# Patient Record
Sex: FEMALE | Race: WHITE
[De-identification: names, ages, dates, MRNs, and addresses within clinical notes are randomized per-mention and may not be internally consistent; named-entity substitution may affect disease eponyms.]

---

## 2021-06-22 ENCOUNTER — HOSPITAL ENCOUNTER (EMERGENCY)
Dept: HOSPITAL 19 - COL.ER | Age: 36
Discharge: HOME | End: 2021-06-22
Attending: EMERGENCY MEDICINE
Payer: COMMERCIAL

## 2021-06-22 VITALS — HEART RATE: 105 BPM

## 2021-06-22 VITALS — SYSTOLIC BLOOD PRESSURE: 115 MMHG | DIASTOLIC BLOOD PRESSURE: 68 MMHG

## 2021-06-22 VITALS — WEIGHT: 215.39 LBS | BODY MASS INDEX: 35.89 KG/M2 | HEIGHT: 65 IN

## 2021-06-22 DIAGNOSIS — M23.91: ICD-10-CM

## 2021-06-22 DIAGNOSIS — S60.212A: ICD-10-CM

## 2021-06-22 DIAGNOSIS — S80.02XA: Primary | ICD-10-CM
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
## 2021-06-22 LAB
PH UR STRIP.AUTO: 5 [PH] (ref 5–8)
RBC # UR: (no result) /HPF
SP GR UR STRIP.AUTO: 1 (ref 1–1.03)
SQUAMOUS # URNS: (no result) /HPF
URN COLLECT METHOD CLASS: (no result)

## 2021-06-22 PROCEDURE — L1830 KO IMMOB CANVAS LONG PRE OTS: HCPCS

## 2021-06-22 PROCEDURE — L1846 KO W ADJ FLEX/EXT ROTAT MOLD: HCPCS

## 2021-06-22 PROCEDURE — 31289: CPT

## 2022-01-26 ENCOUNTER — HOSPITAL ENCOUNTER (OUTPATIENT)
Dept: HOSPITAL 19 - COL.RAD | Age: 37
End: 2022-01-26
Attending: FAMILY MEDICINE
Payer: COMMERCIAL

## 2022-01-26 VITALS — HEART RATE: 79 BPM | DIASTOLIC BLOOD PRESSURE: 85 MMHG | SYSTOLIC BLOOD PRESSURE: 126 MMHG

## 2022-01-26 VITALS — DIASTOLIC BLOOD PRESSURE: 48 MMHG | TEMPERATURE: 98.8 F | SYSTOLIC BLOOD PRESSURE: 107 MMHG | HEART RATE: 83 BPM

## 2022-01-26 VITALS — BODY MASS INDEX: 37.47 KG/M2 | HEIGHT: 65 IN | WEIGHT: 224.87 LBS

## 2022-01-26 DIAGNOSIS — M48.02: Primary | ICD-10-CM

## 2022-04-18 NOTE — NUR
LMOM FOR UDAY. ALSO ASKED FOR MEDS AND ANY SCANS SHE MIGHT HAVE HAD DONE IN
THIS AREA.  GAVE HER INSTERUCTIONS AND CALL BACK NUMBER

## 2022-04-22 ENCOUNTER — HOSPITAL ENCOUNTER (OUTPATIENT)
Dept: HOSPITAL 19 - COL.RAD | Age: 37
End: 2022-04-22
Attending: FAMILY MEDICINE
Payer: COMMERCIAL

## 2022-04-22 VITALS — BODY MASS INDEX: 37.25 KG/M2 | HEIGHT: 65 IN | WEIGHT: 223.55 LBS

## 2022-04-22 VITALS — HEART RATE: 87 BPM | TEMPERATURE: 98.5 F | DIASTOLIC BLOOD PRESSURE: 77 MMHG | SYSTOLIC BLOOD PRESSURE: 108 MMHG

## 2022-04-22 VITALS — HEART RATE: 80 BPM | DIASTOLIC BLOOD PRESSURE: 75 MMHG | SYSTOLIC BLOOD PRESSURE: 107 MMHG

## 2022-04-22 DIAGNOSIS — M48.02: Primary | ICD-10-CM
